# Patient Record
Sex: FEMALE | Race: WHITE
[De-identification: names, ages, dates, MRNs, and addresses within clinical notes are randomized per-mention and may not be internally consistent; named-entity substitution may affect disease eponyms.]

---

## 2019-03-25 ENCOUNTER — HOSPITAL ENCOUNTER (OUTPATIENT)
Dept: HOSPITAL 56 - MW.SDS | Age: 29
Discharge: HOME | End: 2019-03-25
Attending: OBSTETRICS & GYNECOLOGY
Payer: COMMERCIAL

## 2019-03-25 VITALS — DIASTOLIC BLOOD PRESSURE: 74 MMHG | SYSTOLIC BLOOD PRESSURE: 124 MMHG

## 2019-03-25 DIAGNOSIS — Z79.899: ICD-10-CM

## 2019-03-25 DIAGNOSIS — N84.0: Primary | ICD-10-CM

## 2019-03-25 DIAGNOSIS — K21.9: ICD-10-CM

## 2019-03-25 DIAGNOSIS — E66.9: ICD-10-CM

## 2019-03-25 DIAGNOSIS — Z88.2: ICD-10-CM

## 2019-03-25 PROCEDURE — 88305 TISSUE EXAM BY PATHOLOGIST: CPT

## 2019-03-25 PROCEDURE — 36415 COLL VENOUS BLD VENIPUNCTURE: CPT

## 2019-03-25 PROCEDURE — 58558 HYSTEROSCOPY BIOPSY: CPT

## 2019-03-25 PROCEDURE — 85027 COMPLETE CBC AUTOMATED: CPT

## 2019-03-25 PROCEDURE — 84703 CHORIONIC GONADOTROPIN ASSAY: CPT

## 2019-03-25 NOTE — PCM.POSTAN
POST ANESTHESIA ASSESSMENT





- MENTAL STATUS


Mental Status: Alert, Oriented





- RESPIRATORY


Respiratory Status: Respiratory Rate WNL, Airway Patent, O2 Saturation Stable





- CARDIOVASCULAR


CV Status: Pulse Rate WNL, Blood Pressure Stable





- GASTROINTESTINAL


GI Status: No Symptoms





- PAIN


Pain Score: 1





- POST OP HYDRATION


Hydration Status: Adequate & Stable





- OBSERVATIONS


Free Text/Narrative:: 





no anesthesia problems

## 2019-03-25 NOTE — [id number]
Post Anesthesia Note





- EVALUATION WITHIN 48HRS OF ANESTHETIC


Vital Signs in Normal Range: Yes


Patient Participated in Evaluation: Yes


Respiratory Function Stable: Yes


Airway Patent: Yes


Cardiovascular Function Stable: Yes


Hydration Status Stable: Yes


Pain Control Satisfactory: Yes


Nausea and Vomiting Control Satisfactory: Yes


Mental Status Recovered: Yes


Resp Rate: 21





- COMMENTS/OBSERVATIONS


Free Text/Narrative:: 





no anesthesia problems

## 2019-03-25 NOTE — PCM.PREANE
Preanesthetic Assessment





- Anesthesia/Transfusion/Family Hx


Anesthesia History: Prior Anesthesia Without Reaction


Family History of Anesthesia Reaction: No


Transfusion History: No Prior Transfusion(s)


Intubation History: Unknown





- Review of Systems


General: No Symptoms


Pulmonary: No Symptoms


Cardiovascular: No Symptoms


Gastrointestinal: No Symptoms


Neurological: No Symptoms


Other: Reports: None





- Physical Assessment


O2 Sat by Pulse Oximetry: 96


Respiratory Rate: 16


Vital Signs: 





 Last Vital Signs











Temp  36.0 C   19 07:07


 


Pulse  98   19 07:07


 


Resp  16   19 07:07


 


BP  149/90 H  19 07:07


 


Pulse Ox  96   19 07:07











Height: 1.77 m


Weight: 107.955 kg


ASA Class: 2


Mental Status: Alert & Oriented x3


Airway Class: Mallampati = 2


Dentition: Reports: Normal Dentition


Thyro-Mental Finger Breadths: 3


Mouth Opening Finger Breadths: 3


ROM/Head Extension: Full


Lungs: Clear to Auscultation, Normal Respiratory Effort


Cardiovascular: Regular Rate, Regular Rhythm





- Allergies


Allergies/Adverse Reactions: 


 Allergies











Allergy/AdvReac Type Severity Reaction Status Date / Time


 


Sulfa (Sulfonamide Allergy  Hives Verified 19 12:39





Antibiotics)     


 


SEASONAL Allergy Mild Other Uncoded 08/16/15 22:40














- Blood


Blood Available: No





- Anesthesia Plan


Pre-Op Medication Ordered: None





- Acknowledgements


Anesthesia Type Planned: General Anesthesia


Pt an Appropriate Candidate for the Planned Anesthesia: Yes


Alternatives and Risks of Anesthesia Discussed w Pt/Guardian: Yes


Pt/Guardian Understands and Agrees with Anesthesia Plan: Yes





PreAnesthesia Questionnaire


HEENT History: Reports: Other (See Below)


Other HEENT History: wears glasses/contacts


Cardiovascular History: Reports: None


Respiratory History: Reports: None


Gastrointestinal History: Reports: GERD


Genitourinary History: Reports: None


OB/GYN History: Reports: Spontaneous , Other (See Below) (h/o uterine 

polyps)


Other OB/BYN History: ETOP


Musculoskeletal History: Reports: None


Neurological History: Reports: None


Psychiatric History: Reports: None, Other (See Below) (h/o postpartum depression

)


Endocrine/Metabolic History: Reports: Obesity/BMI 30+


Hematologic History: Reports: None


Immunologic History: Reports: None


Oncologic (Cancer) History: Reports: None


Dermatologic History: Reports: None





- Past Surgical History


Head Surgeries/Procedures: Reports: None


HEENT Surgical History: Reports: Oral Surgery


Cardiovascular Surgical History: Reports: None


Respiratory Surgical History: Reports: None


GI Surgical History: Reports: None


Female  Surgical History: Reports: Other (See Below)


Other Female  Surgeries/Procedures: ETOP


Endocrine Surgical History: Reports: None


Neurological Surgical History: Reports: None


Musculoskeletal Surgical History: Reports: None


Oncologic Surgical History: Reports: None


Dermatological Surgical History: Reports: None





- SUBSTANCE USE


Smoking Status *Q: Never Smoker


Recreational Drug Use History: No





- HOME MEDS


Home Medications: 


 Home Meds





Desogestrel-Ethinyl Estradiol [[place] 28 Day Tablet] 1 tab PO ASDIRECTED  [History]


Folic Acid 0.4 mg PO DAILY 19 [History]


PNV95/Ferrous Fumarate/FA [Prenatal Vitamin Tablet] 1 tab PO DAILY 19 [

History]











- CURRENT (IN HOUSE) MEDS


Current Meds: 





 Current Medications





Lactated Ringer's (Ringers, Lactated)  1,000 mls @ 125 mls/hr IV ASDIRECTED REJI


Sodium Chloride (Saline Flush)  10 ml FLUSH ASDIRECTED PRN


   PRN Reason: Keep Vein Open


Sodium Chloride (Saline Flush)  2.5 ml FLUSH ASDIRECTED PRN


   PRN Reason: Keep Vein Open


Sodium Chloride (Normal Saline)  10 ml IV ASDIRECTED PRN


   PRN Reason: IV Use





Discontinued Medications





Fentanyl (Sublimaze) Confirm Administered Dose 250 mcg .ROUTE .STK-MED ONE


   Stop: 19 07:19


Lidocaine (Xylocaine-Mpf 2%) Confirm Administered Dose 5 ml .ROUTE .STK-MED ONE


   Stop: 19 07:19


Midazolam HCl (Versed 1 Mg/Ml)  2 mg IVPUSH ONETIME ONE


   Stop: 19 07:10


Midazolam HCl (Versed 1 Mg/Ml) Confirm Administered Dose 2 mg .ROUTE .STK-MED 

ONE


   Stop: 19 07:19


Ondansetron HCl (Zofran)  4 mg IVPUSH ONETIME ONE


   Stop: 19 07:10


   Last Admin: 19 07:20 Dose:  4 mg


Ondansetron HCl (Zofran) Confirm Administered Dose 4 mg .ROUTE .STK-MED ONE


   Stop: 03/25/19 07:19


Propofol (Diprivan  20 Ml) Confirm Administered Dose 200 mg .ROUTE .STK-MED ONE


   Stop: 19 07:19


Scopolamine (Transderm-Scop)  1.5 mg TOP ONETIME ONE


   Stop: 19 07:10


   Last Admin: 19 07:20 Dose:  1.5 mg

## 2019-03-25 NOTE — OR
SURGEON:

Mira Ruiz M.D.

 

DATE OF PROCEDURE:  03/25/2019

 

PREOPERATIVE DIAGNOSIS:

Endometrial polyp.

 

POSTOPERATIVE DIAGNOSIS:

Endometrial polyp.

 

PROCEDURE:

Operative hysteroscopy with polypectomy, curettage of endometrium.

 

PRIMARY SURGEON:

Mira Ruiz M.D.

 

ASSISTANT:

SUKI Shipman.

 

ANESTHESIA:

General LMA.

 

FLUIDS:

1000 mL of crystalloid.

 

ESTIMATED BLOOD LOSS:

Less than 10 mL.

 

COMPLICATIONS:

None.

 

FINDINGS:

Normal-appearing uterine cavity, anterior mid uterine small polypoid lesion

noted.

 

DISPOSITION:

The patient to PACU, stable. Specimen to pathology.

 

DESCRIPTION OF PROCEDURE:

Manisha is a 28-year-old female who most recently on imaging studies was noted to

have what appears to be a polypoid lesion.  Therefore, she is here today for

further evaluation with hysteroscopy and polypectomy as indicated.  Risks of

procedure have been discussed for her.

 

The patient was taken to the operating room where she underwent general LMA, was

then placed in modified dorsal lithotomy position, was prepped and draped in the

usual sterile fashion.  SCDs to lower extremities.  Bladder was drained.  Time-

out was performed.

 

Speculum was introduced in the vagina.  Anterior lip of cervix was grasped with

Allis clamp.  Cervix gently dilated to 5 mm.  MyoSure hysteroscope was now

gently introduced.  Using normal saline as distention media, I was able to

visualize the fundus of the uterus.  Right ostia and left ostia were able to be

visualized.  Photographs taken.  There was a polypoid lesion along the anterior

mid fundal portion of the uterus in the lower uterine segment.  Otherwise, in

the endocervical canal with no other polypoid lesions.

 

MyoSure was gently introduced and the polypoid lesion was resected to the base

until it was flushed with the remainder of the endometrial cavity.  Specimen to

pathology.  Remainder of the cavity appeared normal.  No other lesion noted.

 

MyoSure was now removed.  Gentle curettage was now performed.  The specimens

will be sent to pathology.  All instruments were removed from vagina.

Hemostasis appeared evident.  Sponge and instrument count was correct x2.  The

patient tolerated the procedure well.  She will go to PACU in stable condition.

 

 

SHUKRI / RODRIGO

DD:  03/25/2019 08:39:48

DT:  03/25/2019 14:44:09

Job #:  [id number]

## 2019-03-25 NOTE — PCM.OPNOTE
- General Post-Op/Procedure Note


Date of Surgery/Procedure: 03/25/19


Operative Procedure(s): operative hysteroscopy, polypectomy


Findings: 


normal appearing uterine cavity, endometrial polyp


Pre Op Diagnosis: endometrial polyp


Post-Op Diagnosis: same


Anesthesia Technique: General LMA


Primary Surgeon: Mira Ruiz


Secondary Surgeon: Mira Haines ([id number])


Anesthesia Provider: Josette Ching


Pathology: 


endometrial polyp, endometrial curettings


Fluid Replacement, Intraop: 1,000


EBL in mLs: 10


Complications: none known


Condition: Good


Free Text/Narrative:: 


see dictation by Dr. Ruiz